# Patient Record
Sex: MALE | Race: WHITE | NOT HISPANIC OR LATINO | URBAN - METROPOLITAN AREA
[De-identification: names, ages, dates, MRNs, and addresses within clinical notes are randomized per-mention and may not be internally consistent; named-entity substitution may affect disease eponyms.]

---

## 2018-02-28 ENCOUNTER — APPOINTMENT (RX ONLY)
Dept: URBAN - METROPOLITAN AREA CLINIC 46 | Facility: CLINIC | Age: 68
Setting detail: DERMATOLOGY
End: 2018-02-28

## 2018-02-28 DIAGNOSIS — Z41.9 ENCOUNTER FOR PROCEDURE FOR PURPOSES OTHER THAN REMEDYING HEALTH STATE, UNSPECIFIED: ICD-10-CM

## 2018-02-28 PROCEDURE — ? LASER HAIR REMOVAL

## 2018-02-28 NOTE — PROCEDURE: LASER HAIR REMOVAL
Post-Procedure Care: Immediate endpoint: perifollicular erythema. Post care reviewed with patient. Tolerated procedure well.  Applied eucerin lotion and elta sunscreen 30 per patient request.
Detail Level: Generalized
Cooling: DCD 40/40
Tolerated Procedure (Optional): Tolerated Well
External Cooling Fan Speed: 0
Spot Size: 18 mm
Pre-Procedure: all present put on their eye protection.
Fluence (Will Not Render If 0): 8
Pulse Duration: 10 ms
Fluence (Will Not Render If 0): 6
Location Override: Ears
Consent: Risks reviewed including but not limited to crusting, scabbing, blistering, scarring, darker or lighter pigmentary change, paradoxical hair regrowth, incomplete removal of hair.
Shaving (Optional): The patient was shaved in the office prior to the procedure
Post-Care Instructions: I reviewed with the patient in detail post-care instructions. Patient should avoid sun for a minimum of 4 weeks before and after treatment. \\nTolerated procedure well.
Fluence (Will Not Render If 0): [id number]
Laser Type: Alexandrite 755nm
Render Post-Care In The Note: No
Cooling Override: cryogen

## 2021-09-03 ENCOUNTER — OFFICE (OUTPATIENT)
Dept: URBAN - METROPOLITAN AREA CLINIC 33 | Facility: CLINIC | Age: 71
End: 2021-09-03

## 2021-09-03 VITALS
HEART RATE: 62 BPM | SYSTOLIC BLOOD PRESSURE: 134 MMHG | TEMPERATURE: 96.9 F | WEIGHT: 177 LBS | DIASTOLIC BLOOD PRESSURE: 81 MMHG | HEIGHT: 65 IN

## 2021-09-03 DIAGNOSIS — Z95.828: ICD-10-CM

## 2021-09-03 DIAGNOSIS — I25.10 CORONARY ARTERY DISEASE: ICD-10-CM

## 2021-09-03 DIAGNOSIS — Z95.0 PRESENCE OF CARDIAC PACEMAKER: ICD-10-CM

## 2021-09-03 DIAGNOSIS — Z86.010 PERSONAL HISTORY OF COLONIC POLYPS: ICD-10-CM

## 2021-09-03 PROCEDURE — 99203 OFFICE O/P NEW LOW 30 MIN: CPT | Performed by: INTERNAL MEDICINE

## 2021-09-03 NOTE — SERVICEHPINOTES
Hx of COLON adenomas, the last COL 2016.  BM's now regular w/ rare spot of blood on the paper (hx of hemorrhoids at COL). On Eliquis for afib/pacer.    Also on Plavix for stents. Not on acid suppression. Has occasional heartburn. No FH COL CA.

## 2024-02-07 ENCOUNTER — OFFICE (OUTPATIENT)
Dept: URBAN - METROPOLITAN AREA CLINIC 57 | Facility: CLINIC | Age: 74
End: 2024-02-07

## 2024-02-07 VITALS
HEIGHT: 65 IN | RESPIRATION RATE: 16 BRPM | TEMPERATURE: 97.4 F | SYSTOLIC BLOOD PRESSURE: 128 MMHG | HEART RATE: 68 BPM | DIASTOLIC BLOOD PRESSURE: 83 MMHG | WEIGHT: 175 LBS

## 2024-02-07 DIAGNOSIS — K21.9 GERD: ICD-10-CM

## 2024-02-07 DIAGNOSIS — Z86.010 PERSONAL HISTORY OF COLONIC POLYPS: ICD-10-CM

## 2024-02-07 PROCEDURE — 99203 OFFICE O/P NEW LOW 30 MIN: CPT | Performed by: INTERNAL MEDICINE

## 2024-02-07 NOTE — SERVICEHPINOTES
The patient is a 73-year-old gentleman who was referred for a colonoscopy. He last had an examination in 2016 at which time tubular adenomas were removed from the ascending colon and rectum. The patient denies any bowel complaints of diarrhea, constipation, rectal bleeding, melena, change in his bowel movements or change in his weight. There is no family history of colonic neoplasia or inflammatory bowel disease. Although the patient had complaints of gastroesophageal reflux disease in the past required pantoprazole, he no longer requires any medication for this problem.

## 2024-02-16 VITALS
SYSTOLIC BLOOD PRESSURE: 106 MMHG | HEIGHT: 65 IN | RESPIRATION RATE: 16 BRPM | OXYGEN SATURATION: 98 % | TEMPERATURE: 97.5 F | DIASTOLIC BLOOD PRESSURE: 90 MMHG | HEART RATE: 93 BPM | WEIGHT: 175 LBS

## 2024-02-20 ENCOUNTER — AMBULATORY SURGICAL CENTER (OUTPATIENT)
Dept: URBAN - METROPOLITAN AREA SURGERY 36 | Facility: SURGERY | Age: 74
End: 2024-02-20

## 2024-02-20 DIAGNOSIS — Z86.010 PERSONAL HISTORY OF COLONIC POLYPS: ICD-10-CM

## 2024-02-20 DIAGNOSIS — K62.1 RECTAL POLYP: ICD-10-CM

## 2024-02-20 PROBLEM — K57.30 DIVERTICULOSIS OF LARGE INTESTINE WITHOUT PERFORATION OR ABS: Status: ACTIVE | Noted: 2024-02-20

## 2024-02-20 PROCEDURE — 45385 COLONOSCOPY W/LESION REMOVAL: CPT | Performed by: INTERNAL MEDICINE
